# Patient Record
Sex: MALE | Race: WHITE | URBAN - METROPOLITAN AREA
[De-identification: names, ages, dates, MRNs, and addresses within clinical notes are randomized per-mention and may not be internally consistent; named-entity substitution may affect disease eponyms.]

---

## 2017-08-22 ENCOUNTER — TRANSFERRED RECORDS (OUTPATIENT)
Dept: HEALTH INFORMATION MANAGEMENT | Facility: CLINIC | Age: 57
End: 2017-08-22

## 2017-10-24 ENCOUNTER — OFFICE VISIT (OUTPATIENT)
Dept: ORTHOPEDICS | Facility: CLINIC | Age: 57
End: 2017-10-24

## 2017-10-24 VITALS — DIASTOLIC BLOOD PRESSURE: 83 MMHG | WEIGHT: 190.5 LBS | SYSTOLIC BLOOD PRESSURE: 134 MMHG

## 2017-10-24 DIAGNOSIS — M47.26 OSTEOARTHRITIS OF SPINE WITH RADICULOPATHY, LUMBAR REGION: Primary | ICD-10-CM

## 2017-10-24 RX ORDER — ZOLPIDEM TARTRATE 10 MG/1
10 TABLET ORAL DAILY
COMMUNITY

## 2017-10-24 RX ORDER — QUETIAPINE FUMARATE 100 MG/1
100 TABLET, FILM COATED ORAL DAILY
COMMUNITY

## 2017-10-24 RX ORDER — CLONAZEPAM 1 MG/1
1 TABLET ORAL DAILY
COMMUNITY

## 2017-10-24 RX ORDER — NAPROXEN 500 MG/1
500 TABLET ORAL DAILY
COMMUNITY
Start: 2017-10-23

## 2017-10-24 RX ORDER — QUETIAPINE FUMARATE 25 MG/1
25 TABLET, FILM COATED ORAL 2 TIMES DAILY
COMMUNITY

## 2017-10-24 NOTE — PROGRESS NOTES
Sports Medicine Clinic Visit    PCP: No primary care provider on file.    Abrahan Mckenna is a 57 year old male who is seen as self referral presenting with low back pain. Coming in for a second opinion, the [facility] wants to do a surgery to hollow out the stenosis. Injured back 5 years ago carrying a window up the ladder. Pt reports more recent MRIs from this past winter. Pt reports radiating pain down to right foot, and tingling in right foot.    Injury: 5 years ago    Location of Pain: low back   Duration of Pain: 5 year(s)  Rating of Pain: 8/10  Pain is better with: PT, Pool Therapy  Pain is worse with: Carrying backpack around, any type of weight (i.e. Taking garbage out)  Treatment so far consists of: PT, Pool Therapy  Prior History of related problems: None    There were no vitals taken for this visit.

## 2017-10-24 NOTE — LETTER
Date:October 25, 2017      Patient was self referred, no letter generated. Do not send.        [facility] Information

## 2017-10-24 NOTE — PROGRESS NOTES
HPI:  Cara Girard is a 57 year old male with low back and intermittent right radicular leg pain since . Initial onset pain at that time lifting heavy objects up ladder at his parent's home while remodeling some windows at his parent's home.  PT protocol in .  MRI and lumbar RUSSELL 2016 for right radicular leg pain, done at [facility], rt sided L5S1 transforaminal injection.  Pt is a mercer.  Referred for consultation at request of Dr.Maust SORENSON clinic.  Pt has recently been denied narcotic prescriptions at their clinic after breaking narcotic contract on two separate instances with non-prescribed amphetamine found on urine screening.  Had been referred to pool therapy for his back according to chart review.  He did see Neurosurgery consistently over time, and their notes are reviewed.  If he failed pool therapy they were offering him right L5S1 hemilaminectomy surgery.  [facility] Neurosurgery most recent note 8/15/17 listed below.  Most recent MRI 8/15/2017 listed below, images pending.  Pt is attending pool therapy currently, and has done land based PT in past.  He has had RUSSELL, and they have not provided lasting relief.  He is not asking for narcotics today.  He is here requesting a surgical second opinion for his low back, as he is not trusting of the surgical opinion he was given at [facility].  He wants to consult with one of our [place] [address]    [facility]#: [id number]   PATIENT: CARA GIRARD   : 1960   DATE OF SERVICE: 08/15/2017     SURGERY CLINIC    SUBJECTIVE: Mr. Cara Girard is a 57-year-old gentleman known to us from clinic for his low back pain that radiates down his right leg. The patient reports the pain has gone on for about 4 years and that it starts in his lower back on the right side and goes into his buttocks and then radiates down the posterior aspect of his leg and then along the lateral aspect of the lower leg into the  lateral part of his foot. He thinks that recently it has gotten worse, which is why he comes in to see us today. He was last seen by us in September, at which time we recommended ongoing conservative treatment. He has undergone 2 nerve root injections and these have not helped. He has done some physical therapy as well, and he thinks that this helped a little bit, but he still continues to have a significant amount of back pain. He does not think that he has any objective weakness, but many times the pain gets to be so severe that he feels like he cannot walk. Now, he walks with a cane at all times due to the pain starting.    OBJECTIVE: The patient is sitting in his chair in a mild amount of distress due to his pain. He is afebrile with a blood pressure of 116/81 and a heart rate of 110. He is awake, alert and oriented to self, place, time and situation with fluent and appropriate speech. Cranial nerves 2-12 are intact. He has full strength in all muscle groups of the upper and lower extremities bilaterally except I think he may have had some trace plantar flexion weakness on the right side. He has a positive straight leg raise on the right side and a crossed straight leg raise on the left side. Although he has full strength throughout all muscle groups in his right leg, he is unable to sustain the strength due to give-way weakness that is pain limited.    IMAGING: MRI of the lumbar spine obtained today demonstrates that the patient has lateral recess stenosis at L5-S1. There is a lumbarized S1 vertebral body and therefore the level that we are describing is at the 2nd full disk space up from the sacrum.    ASSESSMENT: The patient has what seems to be an S1 radiculopathy, although it has some elements of an L5 radiculopathy as well. It is hard to say exactly which one it is, but either way it fits with the level of the impingement on imaging. He would likely benefit from surgery given the fact that he has failed  conservative management.    PLAN: The patient said he wanted to try aquatic therapy prior to proceeding with surgery, but he is open to the idea of surgery. He is going to give us a call if aquatic therapy does not work and then we will get him scheduled for a right L5-S1 hemilaminectomy and lateral recess decompression. Again, this is counting up from the sacrum to the 2nd complete disk space for localization purposes. The risks, benefits and alternatives of surgery were thoroughly explained to the patient. These include excessive bleeding, infection, injury to nerve root, spinal fluid leak, failure to improve, etc. The patient verbalized understanding and was happy with the plan.     The patient was seen by Dr. Feliberto Ambrocio, neurosurgery staff, and he agrees with the above.    Federico Washington MD  Resident Physician   Neurosurgery Service    COSIGNER:  Feliberto Ambrocio MD  Staff Physician   Neurosurgery Service    Received in Transcription: 08/15/2017 09:45:27  T: 08/15/2017 10:30:58  M:   Job #: [id number]  AG/MODL              MR SPINE LUMBAR W/O CONTRAST8/15/2017  [facility]  Result Impression   Impression:    1. Transitional anatomy with lumbarized S1 segment as noted above.  2. Diffuse disc disease throughout the lumbar spine most notable at the levels of L4-5 and L5-S1. Posterior disc protrusion at L5-S1 is again seen abutting the S1 nerve root in the right lateral recess.  3. Unchanged neural foraminal narrowing bilaterally at L3-4 and L4-5, and right-sided neural foraminal narrowing at L5-S1.    I have personally reviewed the image(s) and initial interpretation, and I agree with the findings as documented by the resident/fellow.      Reading Radiologist: Wesley Lau   Reading Resident: Jet Sosa    Result Narrative   Lumbar Spine MR without contrast    History: Lower extremity numbness/tingling, >6wks conservative tx, persistent-progressive sx, surgical candidate  lumbar  radiculpathy  .     Comparison: MRI of spine dated 9/11/2016.     Technique: Sagittal STIR, T1-weighted and T2-weighted and axial T2-weighted spin echo and fat-supressed gradient echo images of the lumbar spine were obtained without intravenous contrast.      Findings:  As noted on prior exam there is lumbarization of S1 with articulation of S1 and S2. This convention will be utilized for the purposes of this dictation, which places the tip of the conus medullaris at  the upper level of L1. This convention was used on the most recent MRI dated 9/11/2016.    Mild retrolisthesis of L3 on L4 and L4 on 5 is unchanged from prior exam. The alignment of the lumbar vertebrae is otherwise normal. Small Schmorl's node in the inferior endplate of L3 slightly larger than the most recent exam. Mild posterior disc narrowing again noted at L5-S1. There is decreased T2 signal in the intervertebral disc spaces throughout the lower thoracic and lumbar spine. Disc desiccation is most notable at the levels of L2-3 through L5-S1. Bone marrow signal is grossly unremarkable. No edema noted on STIR imaging.    The findings on a level by level basis are as follows:    L2-3: Mild bilateral disc bulge. No significant spinal canal or neural foraminal narrowing noted.     L3-4:  Mild bilateral disc bulge. Mild bilateral neural foraminal narrowing. Mild spinal canal narrowing.     L4-5:  Posterior disc bulge with unchanged small right posterior annular fissure and small right lateral protrusion. There is mild bilateral neural foraminal narrowing primarily in the lateral recesses. Mild spinal canal narrowing secondary to disc bulge and ligamentum flavum hypertrophy.    L5-S1:  Broad-based posterior disc bulge with right subarticular protrusion. Right-sided protrusion is again seen abutting the S1 nerve root in the lateral recess. Mild right neural foraminal narrowing primarily in the lateral recess. Mild spinal canal stenosis secondary to  posterior disc bulge and ligamentum flavum hypertrophy.     The paraspinous tissues anteriorly are unremarkable.                          EXAM: MRI OF THE LUMBAR SPINE WITHOUT CONTRAST  CLINICAL INFORMATION: Low back pain radiating down the right leg for 4 years. Patient has attempted physical therapy and oral medical therapy as prescribed by a physician for last 12 weeks.  TECHNICAL INFORMATION: Sagittal fast spin-echo T2-weighted, T1-weighted, STIR, coronal T2-weighted as well as axial fast spin-echo T1 and T2-weighted images of the lumbar spine were obtained on a 1.5 Claudia MRI scanner.  INTERPRETATION: No comparison.  Transitional segmentation. For purposes of this dictation, the S1 segment is partially lumbarized bilaterally but bilateral S1-2 pseudoarticulations. 1 mm degenerative retrolisthesis of L3 on L4. No acute fracture or spondylolysis. Conus is normal and terminates at L1. Imaged upper sacrum and paraspinal soft tissue structures are unremarkable. Disc desiccation and annular bulging are demonstrated at L5-S1 through L3-4 with mild isolated annular bulging at L2-3. Disc space narrowing is mild at L3-4 through L5-S1.  S1-2: No central stenosis or traversing neural compression. Facets are slightly hypoplastic, but the foramina are patent.  L5-S1: Right posterolateral annular fissure with annular bulging slightly more prominent in this distribution and resultant moderate right/mild to moderate left subarticular recess narrowing and encroachment upon the traversing right S1 nerve root, but no central stenosis. Mild bilateral facet hypertrophy with mild to moderate bilateral chronic foraminal narrowing.  L4-5: Mild left subarticular recess narrowing without traversing neural compression or central stenosis. Mild bilateral facet hypertrophy with chronic, mild to moderate bilateral foraminal narrowing.  L3-4: No central stenosis or traversing neural compression. Mild bilateral facet hypertrophy with mild  bilateral chronic foraminal narrowing.  L2-3 through T12-L1: No central stenosis, traversing neural compression or cord compression. Facets and foramina are unremarkable.  CONCLUSION: Multilevel degenerative lumbar spondylosis, a transitional S1 segment and the following notable findings:  1. Right posterolateral L5-S1 annular fissure and annular bulging more prominent in this distribution and encroaching upon the traversing right S1 nerve root.  2. No disc herniation, acute fracture or spondylolysis.  3. Chronic mild to moderate bilateral L5-S1 and L4-5 and mild bilateral L3-4 foraminal stenosis without ganglionic compression.  SC    Electronically signed on 1/27/2016 9:13:00 PM by Ben Phan M.D.             EXAM: TRANSFORAMINAL LUMBAR EPIDUROGRAPHY WITH THERAPEUTIC EPIDURAL CORTICOSTEROID AND ANESTHETIC ADMINISTRATION (RIGHT L5-S1 FORAMEN)  CLINICAL INFORMATION: 56-year-old male with 5 years of right lumbar, buttock, and lateral lower extremity pain. No known injury or lumbar spine surgery. 50 weeks combined of oral prescription medication and physical therapy.  COMPARISON: A comparison lumbar spine MRI study is available from January 27, 2016 demonstrating a transitional lumbosacral junction with designated right L5-S1 annular fissuring with encroachment on the right S1 nerve.  DIAGNOSTIC TRANSFORAMINAL EPIDUROGRAPHY:  TECHNICAL INFORMATION: Right L5-S1 transforaminal injection was requested. With the patient prone and the low back sterilely prepared, intermittent fluoroscopic guidance was used anesthetize the skin with 2.0 mL 1% lidocaine and to carefully advance a 5-inch, 22-gauge spinal needle from a right posterolateral approach until its tip was immediately inferior to the right L5 pedicle. After negative aspiration, under direct live fluoroscopic observation 3.0 mL of 240 mg/mL iohexol were injected and spot films obtained.  INTERPRETATION: Injected contrast dispersed normally in the right greater than  left epidural space from L4 through L5 levels and along the right L5 nerve root sleeve.  CONCLUSION:  1. Normal dispersion of contrast in the right lower lumbar epidural space and along the right L5 nerve root sleeve.  2. Based upon the epidurogram, it appeared to be safe/reasonable to proceed with therapeutic epidural steroid injection at this site.  THERAPEUTIC TRANSFORAMINAL EPIDURAL STEROID INJECTION:  TECHNICAL INFORMATION: Following careful review of the epidurogram, 2.0 mL of 1% lidocaine and 1.0 mL of 10 mg/mL dexamethasone were injected, the needle removed, and additional spot films obtained.  Total fluoroscopy time for the procedure was 8 sec. Total number of fluoroscopic images: 5.  INTERPRETATION: Injected steroid dispersed contrast normally.  CONCLUSION:  1. Successful therapeutic transforaminal epidural steroid injection performed without complication.  2. The patient reported no change in typical pain symptoms immediately after the procedure (R0).  ARLENE      Electronically signed on 5/9/2016 11:40:00 AM by Fred Hernandez M.D.     PMH:      Hordeolum     Mood disorder (**)     Alcohol abuse--sober since end of October 2010     Cocaine Dependence--No use for 1 year, 11/22/2010     Erectile dysfunction     Chronic pain syndrome     Acute suppurative otitis media of left ear with spontaneous rupture of tympanic membrane     Chronic GERD     Colon cancer screening     Acute back pain with sciatica     Left ear pain     Chronic Pain Syndrome - On Controlled Substance Agreement (CSA)     Chronic bilateral low back pain with sciatica     Tobacco dependence       Active problem list:  Patient Active Problem List   Diagnosis   (none) - all problems resolved or deleted       FH:  No family history on file. neg for inherited bone or jt dsr    SH:  Social History     Social History     Marital status: Single     Spouse name: N/A     Number of children: N/A     Years of education: N/A     Occupational History     Not  on file.     Social History Main Topics     Smoking status: Not on file     Smokeless tobacco: Not on file     Alcohol use Not on file     Drug use: Not on file     Sexual activity: Not on file     Other Topics Concern     Not on file     Social History Narrative       MEDS:  See EMR, reviewed  ALL:  See EMR, reviewed    REVIEW OF SYSTEMS:  CONSTITUTIONAL:NEGATIVE for fever, chills, change in weight  INTEGUMENTARY/SKIN: NEGATIVE for worrisome rashes, moles or lesions  EYES: NEGATIVE for vision changes or irritation  ENT/MOUTH: NEGATIVE for ear, mouth and throat problems  RESP:NEGATIVE for significant cough or SOB  BREAST: NEGATIVE for masses, tenderness or discharge  CV: NEGATIVE for chest pain, palpitations or peripheral edema  GI: NEGATIVE for nausea, abdominal pain, heartburn, or change in bowel habits  :NEGATIVE for frequency, dysuria, or hematuria  :NEGATIVE for frequency, dysuria, or hematuria  NEURO: NEGATIVE for weakness, dizziness or paresthesias  ENDOCRINE: NEGATIVE for temperature intolerance, skin/hair changes  HEME/ALLERGY/IMMUNE: NEGATIVE for bleeding problems  PSYCHIATRIC: NEGATIVE for changes in mood or affect    Objective: Does not appear in acute distress. Ambulates with the help of a cane. Straight leg raise is negative bilaterally. Lower extremity strength to flexion and extension at hip, knee, ankle, foot is normal and symmetrical bilaterally. Peripheral pulses strong and symmetrical. Normal range of motion of the bilateral hips. Overlying skin is intact. Appropriate in conversation and affect. Nontender lumbar spine    Assessment: Lumbar spine degenerative disc and joint disease    Plan: We went over his MRI reports with the help of the model. We discussed nonoperative treatment for lumbar spine degenerative disc disease such as a proper strength training program for the low back, epidural steroid injections. He would like to discuss his low back further with a back surgeon and referral was  placed. He signed a release of information to get MRI images from [facility] and we made phone calls to try to obtain these as well.

## 2017-10-24 NOTE — LETTER
10/24/2017      RE: Abrahan Mckenna  [address] [facility] [address]       Sports Medicine Clinic Visit    PCP: No primary care provider on file.    Abrahan Mckenna is a 57 year old male who is seen as self referral presenting with low back pain. Coming in for a second opinion, the [facility] wants to do a surgery to hollow out the stenosis. Injured back 5 years ago carrying a window up the ladder. Pt reports more recent MRIs from this past winter. Pt reports radiating pain down to right foot, and tingling in right foot.    Injury: 5 years ago    Location of Pain: low back   Duration of Pain: 5 year(s)  Rating of Pain: 8/10  Pain is better with: PT, Pool Therapy  Pain is worse with: Carrying backpack around, any type of weight (i.e. Taking garbage out)  Treatment so far consists of: PT, Pool Therapy  Prior History of related problems: None    There were no vitals taken for this visit.    HPI:  Abrahan Mckenna is a 57 year old male with low back and intermittent right radicular leg pain since 2013. Initial onset pain at that time lifting heavy objects up ladder at his parent's home while remodeling some windows at his parent's home.  PT protocol in 2014.  MRI and lumbar RUSSELL 1/2016 for right radicular leg pain, done at [facility] 5/2106, rt sided L5S1 transforaminal injection.  Pt is a mercer.  Referred for consultation at request of Dr.Maust SORENSON clinic.  Pt has recently been denied narcotic prescriptions at their clinic after breaking narcotic contract on two separate instances with non-prescribed amphetamine found on urine screening.  Had been referred to pool therapy for his back according to chart review.  He did see Neurosurgery consistently over time, and their notes are reviewed.  If he failed pool therapy they were offering him right L5S1 hemilaminectomy surgery.  [facility] most recent note 8/15/17 listed below.  Most recent MRI 8/15/2017 listed below, images pending.  Pt is  attending pool therapy currently, and has done land based PT in past.  He has had RUSSELL, and they have not provided lasting relief.  He is not asking for narcotics today.  He is here requesting a surgical second opinion for his low back, as he is not trusting of the surgical opinion he was given at [facility].  He wants to consult with one of [place] [address]    [facility]#: [id number]   PATIENT: CARA GIRARD   : 1960   DATE OF SERVICE: 08/15/2017     SURGERY CLINIC    SUBJECTIVE: Mr. Cara Girard is a 57-year-old gentleman known to us from clinic for his low back pain that radiates down his right leg. The patient reports the pain has gone on for about 4 years and that it starts in his lower back on the right side and goes into his buttocks and then radiates down the posterior aspect of his leg and then along the lateral aspect of the lower leg into the lateral part of his foot. He thinks that recently it has gotten worse, which is why he comes in to see us today. He was last seen by us in September, at which time we recommended ongoing conservative treatment. He has undergone 2 nerve root injections and these have not helped. He has done some physical therapy as well, and he thinks that this helped a little bit, but he still continues to have a significant amount of back pain. He does not think that he has any objective weakness, but many times the pain gets to be so severe that he feels like he cannot walk. Now, he walks with a cane at all times due to the pain starting.    OBJECTIVE: The patient is sitting in his chair in a mild amount of distress due to his pain. He is afebrile with a blood pressure of 116/81 and a heart rate of 110. He is awake, alert and oriented to self, place, time and situation with fluent and appropriate speech. Cranial nerves 2-12 are intact. He has full strength in all muscle groups of the upper and lower extremities  bilaterally except I think he may have had some trace plantar flexion weakness on the right side. He has a positive straight leg raise on the right side and a crossed straight leg raise on the left side. Although he has full strength throughout all muscle groups in his right leg, he is unable to sustain the strength due to give-way weakness that is pain limited.    IMAGING: MRI of the lumbar spine obtained today demonstrates that the patient has lateral recess stenosis at L5-S1. There is a lumbarized S1 vertebral body and therefore the level that we are describing is at the 2nd full disk space up from the sacrum.    ASSESSMENT: The patient has what seems to be an S1 radiculopathy, although it has some elements of an L5 radiculopathy as well. It is hard to say exactly which one it is, but either way it fits with the level of the impingement on imaging. He would likely benefit from surgery given the fact that he has failed conservative management.    PLAN: The patient said he wanted to try aquatic therapy prior to proceeding with surgery, but he is open to the idea of surgery. He is going to give us a call if aquatic therapy does not work and then we will get him scheduled for a right L5-S1 hemilaminectomy and lateral recess decompression. Again, this is counting up from the sacrum to the 2nd complete disk space for localization purposes. The risks, benefits and alternatives of surgery were thoroughly explained to the patient. These include excessive bleeding, infection, injury to nerve root, spinal fluid leak, failure to improve, etc. The patient verbalized understanding and was happy with the plan.     The patient was seen by Dr. Feliberto Ambrocio, neurosurgery staff, and he agrees with the above.    Federico Washington MD  Resident Physician   Neurosurgery Service    COSIGNER:  Feliberto Ambrocio MD  Staff Physician   Neurosurgery Service    Received in Transcription: 08/15/2017 09:45:27  T: 08/15/2017 10:30:58  M:   Job #:  [id number]  AG/MODL              MR SPINE LUMBAR W/O CONTRAST8/15/2017  [facility]  Result Impression   Impression:    1. Transitional anatomy with lumbarized S1 segment as noted above.  2. Diffuse disc disease throughout the lumbar spine most notable at the levels of L4-5 and L5-S1. Posterior disc protrusion at L5-S1 is again seen abutting the S1 nerve root in the right lateral recess.  3. Unchanged neural foraminal narrowing bilaterally at L3-4 and L4-5, and right-sided neural foraminal narrowing at L5-S1.    I have personally reviewed the image(s) and initial interpretation, and I agree with the findings as documented by the resident/fellow.      Reading Radiologist: Wesley Lau   Reading Resident: Jet Sosa    Result Narrative   Lumbar Spine MR without contrast    History: Lower extremity numbness/tingling, >6wks conservative tx, persistent-progressive sx, surgical candidate  lumbar radiculpathy  .     Comparison: MRI of spine dated 9/11/2016.     Technique: Sagittal STIR, T1-weighted and T2-weighted and axial T2-weighted spin echo and fat-supressed gradient echo images of the lumbar spine were obtained without intravenous contrast.      Findings:  As noted on prior exam there is lumbarization of S1 with articulation of S1 and S2. This convention will be utilized for the purposes of this dictation, which places the tip of the conus medullaris at  the upper level of L1. This convention was used on the most recent MRI dated 9/11/2016.    Mild retrolisthesis of L3 on L4 and L4 on 5 is unchanged from prior exam. The alignment of the lumbar vertebrae is otherwise normal. Small Schmorl's node in the inferior endplate of L3 slightly larger than the most recent exam. Mild posterior disc narrowing again noted at L5-S1. There is decreased T2 signal in the intervertebral disc spaces throughout the lower thoracic and lumbar spine. Disc desiccation is most notable at the levels of L2-3  through L5-S1. Bone marrow signal is grossly unremarkable. No edema noted on STIR imaging.    The findings on a level by level basis are as follows:    L2-3: Mild bilateral disc bulge. No significant spinal canal or neural foraminal narrowing noted.     L3-4:  Mild bilateral disc bulge. Mild bilateral neural foraminal narrowing. Mild spinal canal narrowing.     L4-5:  Posterior disc bulge with unchanged small right posterior annular fissure and small right lateral protrusion. There is mild bilateral neural foraminal narrowing primarily in the lateral recesses. Mild spinal canal narrowing secondary to disc bulge and ligamentum flavum hypertrophy.    L5-S1:  Broad-based posterior disc bulge with right subarticular protrusion. Right-sided protrusion is again seen abutting the S1 nerve root in the lateral recess. Mild right neural foraminal narrowing primarily in the lateral recess. Mild spinal canal stenosis secondary to posterior disc bulge and ligamentum flavum hypertrophy.     The paraspinous tissues anteriorly are unremarkable.                          EXAM: MRI OF THE LUMBAR SPINE WITHOUT CONTRAST  CLINICAL INFORMATION: Low back pain radiating down the right leg for 4 years. Patient has attempted physical therapy and oral medical therapy as prescribed by a physician for last 12 weeks.  TECHNICAL INFORMATION: Sagittal fast spin-echo T2-weighted, T1-weighted, STIR, coronal T2-weighted as well as axial fast spin-echo T1 and T2-weighted images of the lumbar spine were obtained on a 1.5 Claudia MRI scanner.  INTERPRETATION: No comparison.  Transitional segmentation. For purposes of this dictation, the S1 segment is partially lumbarized bilaterally but bilateral S1-2 pseudoarticulations. 1 mm degenerative retrolisthesis of L3 on L4. No acute fracture or spondylolysis. Conus is normal and terminates at L1. Imaged upper sacrum and paraspinal soft tissue structures are unremarkable. Disc desiccation and annular bulging are  demonstrated at L5-S1 through L3-4 with mild isolated annular bulging at L2-3. Disc space narrowing is mild at L3-4 through L5-S1.  S1-2: No central stenosis or traversing neural compression. Facets are slightly hypoplastic, but the foramina are patent.  L5-S1: Right posterolateral annular fissure with annular bulging slightly more prominent in this distribution and resultant moderate right/mild to moderate left subarticular recess narrowing and encroachment upon the traversing right S1 nerve root, but no central stenosis. Mild bilateral facet hypertrophy with mild to moderate bilateral chronic foraminal narrowing.  L4-5: Mild left subarticular recess narrowing without traversing neural compression or central stenosis. Mild bilateral facet hypertrophy with chronic, mild to moderate bilateral foraminal narrowing.  L3-4: No central stenosis or traversing neural compression. Mild bilateral facet hypertrophy with mild bilateral chronic foraminal narrowing.  L2-3 through T12-L1: No central stenosis, traversing neural compression or cord compression. Facets and foramina are unremarkable.  CONCLUSION: Multilevel degenerative lumbar spondylosis, a transitional S1 segment and the following notable findings:  1. Right posterolateral L5-S1 annular fissure and annular bulging more prominent in this distribution and encroaching upon the traversing right S1 nerve root.  2. No disc herniation, acute fracture or spondylolysis.  3. Chronic mild to moderate bilateral L5-S1 and L4-5 and mild bilateral L3-4 foraminal stenosis without ganglionic compression.  SC    Electronically signed on 1/27/2016 9:13:00 PM by Ben Phan M.D.             EXAM: TRANSFORAMINAL LUMBAR EPIDUROGRAPHY WITH THERAPEUTIC EPIDURAL CORTICOSTEROID AND ANESTHETIC ADMINISTRATION (RIGHT L5-S1 FORAMEN)  CLINICAL INFORMATION: 56-year-old male with 5 years of right lumbar, buttock, and lateral lower extremity pain. No known injury or lumbar spine surgery. 50 weeks  combined of oral prescription medication and physical therapy.  COMPARISON: A comparison lumbar spine MRI study is available from January 27, 2016 demonstrating a transitional lumbosacral junction with designated right L5-S1 annular fissuring with encroachment on the right S1 nerve.  DIAGNOSTIC TRANSFORAMINAL EPIDUROGRAPHY:  TECHNICAL INFORMATION: Right L5-S1 transforaminal injection was requested. With the patient prone and the low back sterilely prepared, intermittent fluoroscopic guidance was used anesthetize the skin with 2.0 mL 1% lidocaine and to carefully advance a 5-inch, 22-gauge spinal needle from a right posterolateral approach until its tip was immediately inferior to the right L5 pedicle. After negative aspiration, under direct live fluoroscopic observation 3.0 mL of 240 mg/mL iohexol were injected and spot films obtained.  INTERPRETATION: Injected contrast dispersed normally in the right greater than left epidural space from L4 through L5 levels and along the right L5 nerve root sleeve.  CONCLUSION:  1. Normal dispersion of contrast in the right lower lumbar epidural space and along the right L5 nerve root sleeve.  2. Based upon the epidurogram, it appeared to be safe/reasonable to proceed with therapeutic epidural steroid injection at this site.  THERAPEUTIC TRANSFORAMINAL EPIDURAL STEROID INJECTION:  TECHNICAL INFORMATION: Following careful review of the epidurogram, 2.0 mL of 1% lidocaine and 1.0 mL of 10 mg/mL dexamethasone were injected, the needle removed, and additional spot films obtained.  Total fluoroscopy time for the procedure was 8 sec. Total number of fluoroscopic images: 5.  INTERPRETATION: Injected steroid dispersed contrast normally.  CONCLUSION:  1. Successful therapeutic transforaminal epidural steroid injection performed without complication.  2. The patient reported no change in typical pain symptoms immediately after the procedure (R0).  ARLENE      Electronically signed on 5/9/2016  11:40:00 AM by Fred Hernandez M.D.     PMH:      Hordeolum     Mood disorder (**)     Alcohol abuse--sober since end of October 2010     Cocaine Dependence--No use for 1 year, 11/22/2010     Erectile dysfunction     Chronic pain syndrome     Acute suppurative otitis media of left ear with spontaneous rupture of tympanic membrane     Chronic GERD     Colon cancer screening     Acute back pain with sciatica     Left ear pain     Chronic Pain Syndrome - On Controlled Substance Agreement (CSA)     Chronic bilateral low back pain with sciatica     Tobacco dependence       Active problem list:  Patient Active Problem List   Diagnosis   (none) - all problems resolved or deleted       FH:  No family history on file. neg for inherited bone or jt dsr    SH:  Social History     Social History     Marital status: Single     Spouse name: N/A     Number of children: N/A     Years of education: N/A     Occupational History     Not on file.     Social History Main Topics     Smoking status: Not on file     Smokeless tobacco: Not on file     Alcohol use Not on file     Drug use: Not on file     Sexual activity: Not on file     Other Topics Concern     Not on file     Social History Narrative       MEDS:  See EMR, reviewed  ALL:  See EMR, reviewed    REVIEW OF SYSTEMS:  CONSTITUTIONAL:NEGATIVE for fever, chills, change in weight  INTEGUMENTARY/SKIN: NEGATIVE for worrisome rashes, moles or lesions  EYES: NEGATIVE for vision changes or irritation  ENT/MOUTH: NEGATIVE for ear, mouth and throat problems  RESP:NEGATIVE for significant cough or SOB  BREAST: NEGATIVE for masses, tenderness or discharge  CV: NEGATIVE for chest pain, palpitations or peripheral edema  GI: NEGATIVE for nausea, abdominal pain, heartburn, or change in bowel habits  :NEGATIVE for frequency, dysuria, or hematuria  :NEGATIVE for frequency, dysuria, or hematuria  NEURO: NEGATIVE for weakness, dizziness or paresthesias  ENDOCRINE: NEGATIVE for temperature  intolerance, skin/hair changes  HEME/ALLERGY/IMMUNE: NEGATIVE for bleeding problems  PSYCHIATRIC: NEGATIVE for changes in mood or affect    Objective: Does not appear in acute distress. Ambulates with the help of a cane. Straight leg raise is negative bilaterally. Lower extremity strength to flexion and extension at hip, knee, ankle, foot is normal and symmetrical bilaterally. Peripheral pulses strong and symmetrical. Normal range of motion of the bilateral hips. Overlying skin is intact. Appropriate in conversation and affect. Nontender lumbar spine    Assessment: Lumbar spine degenerative disc and joint disease    Plan: We went over his MRI reports with the help of the model. We discussed nonoperative treatment for lumbar spine degenerative disc disease such as a proper strength training program for the low back, epidural steroid injections. He would like to discuss his low back further with a back surgeon and referral was placed. He signed a release of information to get MRI images from [facility] and we made phone calls to try to obtain these as well.                Darrin Fernandez MD

## 2017-10-24 NOTE — MR AVS SNAPSHOT
After Visit Summary   10/24/2017    Abrahan Mckenna    MRN: [id number]           Patient Information     Date Of Birth          1960        Visit Information        Provider Department      10/24/2017 1:00 PM Darrin Fernandez MD [facility]        Today's Diagnoses     Osteoarthritis of spine with radiculopathy, lumbar region    -  1       Follow-ups after your visit        Additional Services     ORTHOPEDICS ADULT REFERRAL       Your provider has referred you to: UORTHO spine surgery referral:  Re:  Lumbar djd and radicular leg pain not responding to conservative cares    Please be aware that coverage of these services is subject to the terms and limitations of your health insurance plan.  Call member services at your health plan with any benefit or coverage questions.      Please bring the following to your appointment:    >>   Any x-rays, CTs or MRIs which have been performed.  Contact the facility where they were done to arrange for  prior to your scheduled appointment.    >>   List of current medications   >>   This referral request   >>   Any documents/labs given to you for this referral                  Your next 10 appointments already scheduled     Oct 31, 2017  9:30 AM CDT   (Arrive by 9:00 AM)   NEW SPINE with Wesley Mayfield MD   [facility] ([facility])    [address] [place] [id number] [phone]              Who to contact     Please call your clinic at [phone] to:    Ask questions about your health    Make or cancel appointments    Discuss your medicines    Learn about your test results    Speak to your doctor   If you have compliments or concerns about an experience at your clinic, or if you wish to file a complaint, please contact [facility] Patient Relations at [phone] or email us at [email or web address]         Additional  Information About Your Visit        [employer] Information     [employer] is an electronic gateway that provides easy, online access to your medical records. With [employer], you can request a clinic appointment, read your test results, renew a prescription or communicate with your care team.     To sign up for [employer] visit the website at [email or web address]   You will be asked to enter the access code listed below, as well as some personal information. Please follow the directions to create your username and password.     Your access code is: [email or web address]  Expires: 2018  6:30 AM     Your access code will  in 90 days. If you need help or a new code, please contact your [facility] or call [phone] for assistance.        Care EveryWhere ID     This is your Care EveryWhere ID. This could be used by other organizations to access your [facility] medical records  [id number]         Blood Pressure from Last 3 Encounters:   10/24/17 134/83    Weight from Last 3 Encounters:   10/24/17 86.4 kg (190 lb 8 oz)              We Performed the Following     ORTHOPEDICS ADULT REFERRAL        Primary Care Provider    None Specified       No primary provider on file.        Equal Access to Services     [facility]: Hadii lainey Brown, wajeanieda aaron, qaybta kaalmada jabier, carine rolle . So [facility] [phone].    ATENCIÓN: Si habla español, tiene a noonan disposición servicios gratuitos de asistencia lingüística. Llame al [phone].    We comply with applicable federal civil rights laws and [place] laws. We do not discriminate on the basis of race, color, national origin, age, disability, sex, sexual orientation, or gender identity.            Thank you!     Thank you for choosing [facility]  for your care. Our goal is always to provide you with excellent care. Hearing back from our patients is one way we can continue to improve  our services. Please take a few minutes to complete the written survey that you may receive in the mail after your visit with us. Thank you!             Your Updated Medication List - Protect others around you: Learn how to safely use, store and throw away your medicines at www.disposemymeds.org.          This list is accurate as of: 10/24/17  1:29 PM.  Always use your most recent med list.                   Brand Name Dispense Instructions for use Diagnosis    clonazePAM 1 MG tablet    klonoPIN     Take 1 mg by mouth daily        naproxen 500 MG tablet    NAPROSYN     Take 500 mg by mouth daily        * QUEtiapine 100 MG tablet    SEROquel     Take 100 mg by mouth daily        * QUEtiapine 25 MG tablet    SEROquel     Take 25 mg by mouth 2 times daily        ranitidine 150 MG tablet    ZANTAC     Take 150 mg by mouth 2 times daily Reports taking seasonally        zolpidem 10 MG tablet    AMBIEN     Take 10 mg by mouth daily        * Notice:  This list has 2 medication(s) that are the same as other medications prescribed for you. Read the directions carefully, and ask your doctor or other care provider to review them with you.

## 2017-10-25 ENCOUNTER — PRE VISIT (OUTPATIENT)
Dept: ORTHOPEDICS | Facility: CLINIC | Age: 57
End: 2017-10-25

## 2017-10-25 NOTE — TELEPHONE ENCOUNTER
Radiology Reports From: [facility]    Exam Date/Name: -MRI L-Spine 1/27/16  -Epidural Injection Lumbar 5/9/16    Comments: Images received in PACS.

## 2017-10-25 NOTE — TELEPHONE ENCOUNTER
Received Imaging From: [facility]     Image Type (x): Disc:_____    Pacs:__X___      Exam Date/Name: -MR Lumbar Spine 1/27/16   -Epidurography Lumbar 5/9/16    Comments:   Emailed Diana to pull

## 2017-10-25 NOTE — TELEPHONE ENCOUNTER
"APPT INFO    Date /Time: 10/31/17 - 9:30 AM    Reason for Appt: Osteoarthritis of spine with radiculopathy, lumbar region   Ref Provider/Clinic: Dr. Fernandez - internal    Patient Contact (Y/N) & Call Details: Yes   Action: LM to call back. Need LISA for outside records.      RECORDS CLINIC NAME  (\"None\" if no records ) RECEIVED RECS & IMG? Y/N   (may include other helpful notes)   Internal Clinics: [facility] Records with Dr. Fernandez in [facility].         External Clinics: [facility] Need LISA     [facility] Need LISA     [facility]  Faxed cover sheet        "

## 2017-10-26 NOTE — TELEPHONE ENCOUNTER
Phone Call:    Who did you talk to? (or) Who did you call? Patient    Call Detail/Action: Called patient for LISA's. Patient declined to sign LISA's.   Explained to him that we can request the records for him if he just signs an LISA. Patient declined again and said he said he will go down to [facility] tomorrow and just get the records himself.      ACTION    What did you do? Faxed [facility] in attempt for records.

## 2017-11-20 ENCOUNTER — TELEPHONE (OUTPATIENT)
Dept: ORTHOPEDICS | Facility: CLINIC | Age: 57
End: 2017-11-20

## 2017-11-20 NOTE — TELEPHONE ENCOUNTER
Called patient per  to state that he and  are not willing to place an Rx for Oxycodone or any other type of narcotics. Informed him that he can ask his primary care doctor for any Rx with regards to the narcotics, also that if he was still having back pain, to set up an appointment with the surgeon again, since he cancelled his appt. with Dr. Mayfield. A call back number was left if he had any further questions.

## 2017-11-21 NOTE — TELEPHONE ENCOUNTER
Called patient back regarding his request for narcotics. See below of explanation of denial for narcotics from /. Patient was curious who the spine surgeon was, after informing him that  referred Abrahan to  he said that he would gather records for  and set up a spine surgery consult with . All questions were answered at this time.